# Patient Record
Sex: FEMALE | Race: WHITE
[De-identification: names, ages, dates, MRNs, and addresses within clinical notes are randomized per-mention and may not be internally consistent; named-entity substitution may affect disease eponyms.]

---

## 2020-01-27 ENCOUNTER — HOSPITAL ENCOUNTER (EMERGENCY)
Dept: HOSPITAL 53 - M ED | Age: 35
LOS: 1 days | Discharge: HOME | End: 2020-01-28
Payer: COMMERCIAL

## 2020-01-27 VITALS — DIASTOLIC BLOOD PRESSURE: 64 MMHG | SYSTOLIC BLOOD PRESSURE: 124 MMHG

## 2020-01-27 VITALS — WEIGHT: 201.72 LBS | BODY MASS INDEX: 37.12 KG/M2 | HEIGHT: 62 IN

## 2020-01-27 DIAGNOSIS — J45.909: ICD-10-CM

## 2020-01-27 DIAGNOSIS — J44.9: ICD-10-CM

## 2020-01-27 DIAGNOSIS — R59.1: ICD-10-CM

## 2020-01-27 DIAGNOSIS — L03.211: Primary | ICD-10-CM

## 2020-01-27 LAB
ALBUMIN SERPL BCG-MCNC: 3.6 GM/DL (ref 3.2–5.2)
ALT SERPL W P-5'-P-CCNC: 28 U/L (ref 12–78)
BASOPHILS # BLD AUTO: 0 10^3/UL (ref 0–0.2)
BASOPHILS NFR BLD AUTO: 0.4 % (ref 0–1)
BILIRUB CONJ SERPL-MCNC: < 0.1 MG/DL (ref 0–0.2)
BILIRUB SERPL-MCNC: 0.4 MG/DL (ref 0.2–1)
BUN SERPL-MCNC: 9 MG/DL (ref 7–18)
CALCIUM SERPL-MCNC: 8.7 MG/DL (ref 8.5–10.1)
CHLORIDE SERPL-SCNC: 106 MEQ/L (ref 98–107)
CO2 SERPL-SCNC: 24 MEQ/L (ref 21–32)
CREAT SERPL-MCNC: 0.73 MG/DL (ref 0.55–1.3)
CRP SERPL-MCNC: 3.15 MG/DL (ref 0–0.3)
EOSINOPHIL # BLD AUTO: 0 10^3/UL (ref 0–0.5)
EOSINOPHIL NFR BLD AUTO: 0.2 % (ref 0–3)
FLUAV RNA UPPER RESP QL NAA+PROBE: NEGATIVE
FLUBV RNA UPPER RESP QL NAA+PROBE: NEGATIVE
GFR SERPL CREATININE-BSD FRML MDRD: > 60 ML/MIN/{1.73_M2} (ref 60–?)
GLUCOSE SERPL-MCNC: 89 MG/DL (ref 70–100)
HCT VFR BLD AUTO: 42.3 % (ref 36–47)
HGB BLD-MCNC: 13.6 G/DL (ref 12–15.5)
LYMPHOCYTES # BLD AUTO: 1.6 10^3/UL (ref 1.5–5)
LYMPHOCYTES NFR BLD AUTO: 15.2 % (ref 24–44)
MCH RBC QN AUTO: 28 PG (ref 27–33)
MCHC RBC AUTO-ENTMCNC: 32.2 G/DL (ref 32–36.5)
MCV RBC AUTO: 87 FL (ref 80–96)
MONOCYTES # BLD AUTO: 0.4 10^3/UL (ref 0–0.8)
MONOCYTES NFR BLD AUTO: 3.9 % (ref 0–5)
NEUTROPHILS # BLD AUTO: 8.2 10^3/UL (ref 1.5–8.5)
NEUTROPHILS NFR BLD AUTO: 79.7 % (ref 36–66)
PLATELET # BLD AUTO: 307 10^3/UL (ref 150–450)
POTASSIUM SERPL-SCNC: 5.1 MEQ/L (ref 3.5–5.1)
PROT SERPL-MCNC: 8.5 GM/DL (ref 6.4–8.2)
RBC # BLD AUTO: 4.86 10^6/UL (ref 4–5.4)
SODIUM SERPL-SCNC: 136 MEQ/L (ref 136–145)
WBC # BLD AUTO: 10.2 10^3/UL (ref 4–10)

## 2020-01-27 PROCEDURE — 80076 HEPATIC FUNCTION PANEL: CPT

## 2020-01-27 PROCEDURE — 87040 BLOOD CULTURE FOR BACTERIA: CPT

## 2020-01-27 PROCEDURE — 80048 BASIC METABOLIC PNL TOTAL CA: CPT

## 2020-01-27 PROCEDURE — 96361 HYDRATE IV INFUSION ADD-ON: CPT

## 2020-01-27 PROCEDURE — 85025 COMPLETE CBC W/AUTO DIFF WBC: CPT

## 2020-01-27 PROCEDURE — 96375 TX/PRO/DX INJ NEW DRUG ADDON: CPT

## 2020-01-27 PROCEDURE — 86140 C-REACTIVE PROTEIN: CPT

## 2020-01-27 PROCEDURE — 87502 INFLUENZA DNA AMP PROBE: CPT

## 2020-01-27 PROCEDURE — 83605 ASSAY OF LACTIC ACID: CPT

## 2020-01-27 PROCEDURE — 99284 EMERGENCY DEPT VISIT MOD MDM: CPT

## 2020-01-27 PROCEDURE — 70491 CT SOFT TISSUE NECK W/DYE: CPT

## 2020-01-27 PROCEDURE — 96365 THER/PROPH/DIAG IV INF INIT: CPT

## 2020-01-27 NOTE — REPVR
PROCEDURE INFORMATION: 

Exam: CT Neck With Contrast 

Exam date and time: 1/27/2020 10:37 PM 

Age: 34 years old 

Clinical indication: Neck pain; Additional info: Mass L side neck, dysphagia, 

fever 



TECHNIQUE: 

Imaging protocol: Computed tomography images of the neck with intravenous 

contrast. 

Radiation optimization: All CT scans at this facility use at least one of these 

dose optimization techniques: automated exposure control; mA and/or kV 

adjustment per patient size (includes targeted exams where dose is matched to 

clinical indication); or iterative reconstruction. 

Contrast material: ISOVUE 370; Contrast volume: 75 ml; Contrast route: IV;  



COMPARISON: 

No relevant prior studies available. 



FINDINGS: 

Nasopharynx: Unremarkable. 

Oropharynx: Unremarkable. No significant tonsillar enlargement. 

Hypopharynx: Unremarkable 

Larynx: Unremarkable. Normal epiglottis. 

Retropharyngeal space: Unremarkable. 

Submandibular/Parotid glands: Normal. Glands are normal in size. 

Thyroid: Normal. No enlarged or calcified nodules.  

Lymph nodes: Reactive lymphadenopathy, worse in the left side of the neck. 

Trachea: Visualized trachea is unremarkable. 

Lungs: Unremarkable as visualized. 

Bones/joints: Unremarkable. No acute fracture. 

Soft tissues: Mild skin thickening and subcutaneous edema in the left facial 

soft tissues. Mild thickening of the left platysma. No abscess or drainable 

fluid collection. 



IMPRESSION: 

Left facial cellulitis with reactive lymphadenopathy. No abscess or mass.



Electronically signed by: Tejas Springer On 01/27/2020  23:16:53 PM

## 2025-07-21 ENCOUNTER — OFFICE VISIT (OUTPATIENT)
Dept: URBAN - METROPOLITAN AREA CLINIC 81 | Facility: CLINIC | Age: 40
End: 2025-07-21
Payer: COMMERCIAL

## 2025-07-21 DIAGNOSIS — H53.19 OTHER SUBJECTIVE VISUAL DISTURBANCES: Primary | ICD-10-CM

## 2025-07-21 DIAGNOSIS — H52.4 PRESBYOPIA: ICD-10-CM

## 2025-07-21 PROCEDURE — 99204 OFFICE O/P NEW MOD 45 MIN: CPT | Performed by: OPTOMETRIST

## 2025-07-21 PROCEDURE — 92133 CPTRZD OPH DX IMG PST SGM ON: CPT | Performed by: OPTOMETRIST

## 2025-07-21 PROCEDURE — 92134 CPTRZ OPH DX IMG PST SGM RTA: CPT | Performed by: OPTOMETRIST

## 2025-07-21 ASSESSMENT — INTRAOCULAR PRESSURE
OS: 15
OD: 19

## 2025-07-21 ASSESSMENT — KERATOMETRY
OS: 45.50
OD: 45.88